# Patient Record
Sex: MALE | URBAN - METROPOLITAN AREA
[De-identification: names, ages, dates, MRNs, and addresses within clinical notes are randomized per-mention and may not be internally consistent; named-entity substitution may affect disease eponyms.]

---

## 2021-12-13 ENCOUNTER — OFFICE VISIT (OUTPATIENT)
Dept: URBAN - METROPOLITAN AREA CLINIC 92 | Facility: CLINIC | Age: 56
End: 2021-12-13
Payer: COMMERCIAL

## 2021-12-13 DIAGNOSIS — H52.4 PRESBYOPIA: Primary | ICD-10-CM

## 2021-12-13 PROCEDURE — 92004 COMPRE OPH EXAM NEW PT 1/>: CPT | Performed by: OPTOMETRIST

## 2021-12-13 ASSESSMENT — INTRAOCULAR PRESSURE
OS: 11
OD: 12

## 2021-12-13 ASSESSMENT — KERATOMETRY
OS: 39.50
OD: 39.25

## 2021-12-13 NOTE — IMPRESSION/PLAN
Impression: Presbyopia: H52.4. Plan: New glasses RX given today. Discussed findings of bear tracks OS in detail with patient. Per patient colonoscopy 1 month ago was normal.
Will continue to observe.